# Patient Record
Sex: MALE | Race: WHITE
[De-identification: names, ages, dates, MRNs, and addresses within clinical notes are randomized per-mention and may not be internally consistent; named-entity substitution may affect disease eponyms.]

---

## 2020-01-25 ENCOUNTER — HOSPITAL ENCOUNTER (EMERGENCY)
Dept: HOSPITAL 39 - ER | Age: 60
LOS: 1 days | Discharge: HOME | End: 2020-01-26
Payer: COMMERCIAL

## 2020-01-25 VITALS — OXYGEN SATURATION: 96 % | TEMPERATURE: 99.7 F

## 2020-01-25 DIAGNOSIS — B34.9: Primary | ICD-10-CM

## 2020-01-25 NOTE — ED.PDOC
History of Present Illness





- General


Chief Complaint: Fever


Stated Complaint: fever, lethargy


Time Seen by Provider: 01/25/20 23:14


Source: patient, RN notes reviewed, Vital Signs reviewed


Exam Limitations: no limitations





- History of Present Illness


Initial Comments: 





60 yo male with 2 day h/o fever and body aches.  States he has had fever to 

100.4 at home and body aches and 2 episodes of diarrhea today.  Denies HA, neck 

stiffness, nausea, vomiting or abdominal pain.  No cough or sore throat.  he has

not taken anything for fever or pain at home.  States he was googling his 

symptoms tonight and it said he might have the Flu, so came to ED to be checked.





Review of Systems





- Review of Systems


Constitutional: States: fever.  Denies: chills, weakness


EENTM: Denies: ear pain, nose congestion, throat pain


Respiratory: Denies: cough, short of breath, wheezing


Cardiology: Denies: chest pain, palpitations, syncope


Gastrointestinal/Abdominal: States: diarrhea.  Denies: abdominal pain, nausea, 

vomiting


Musculoskeletal: States: muscle pain.  Denies: back pain, neck pain


Skin: Denies: dryness, rash


All other Systems: Reviewed and Negative





Family Medical History





- Family History


  ** Father


Family History: Unknown





Physical Exam





- Physical Exam


General Appearance: Alert, Comfortable, No apparent distress


ENT Exam: TMs normal, pharynx normal


Neck: non-tender, full range of motion, supple


Respiratory: chest non-tender, lungs clear, normal breath sounds, no respiratory

distress


Cardiovascular/Chest: regular rate, rhythm, no murmur


Gastrointestinal/Abdominal: non tender, soft, no pulsatile mass


Extremity: non-tender, normal inspection, no calf tenderness


Neurologic: no motor/sensory deficits, alert, normal mood/affect


Skin Exam: normal color, warm/dry





Progress





- Progress


Progress: 





01/26/20 00:15


Influenza A and B negative





Pt presents with fever of 100 and body aches for 2 days.  He is nontoxic 

appearing with no respiratory distress.  Flu negative.  Lungs are clear.  No abd

pain, NV.  No sign of dehydration.  Discussed symptomatic treatment with 

Tylenol, Motrin, increase po fluids and rrest.  Will f/u with PCP in 1-2 days 

for recheck.  SRP given.





Departure





- Departure


Clinical Impression: 


 Viral syndrome





Time of Disposition: 00:14


Disposition: Discharge to Home or Self Care


Condition: Fair


Departure Forms:  ED Discharge - Pt. Copy, Patient Portal Self Enrollment


Instructions:  DI for Fever (Symptom) -- Adult


Diet: resume usual diet


Activity: increase activity as tolerated


Additional Instructions: 


Follow up with PCP in 1-2 days for recheck.

## 2020-01-26 VITALS — SYSTOLIC BLOOD PRESSURE: 126 MMHG | DIASTOLIC BLOOD PRESSURE: 60 MMHG
